# Patient Record
Sex: MALE | Race: WHITE | URBAN - METROPOLITAN AREA
[De-identification: names, ages, dates, MRNs, and addresses within clinical notes are randomized per-mention and may not be internally consistent; named-entity substitution may affect disease eponyms.]

---

## 2023-12-24 ENCOUNTER — HOSPITAL ENCOUNTER (EMERGENCY)
Age: 38
Discharge: HOME | End: 2023-12-24
Attending: EMERGENCY MEDICINE
Payer: COMMERCIAL

## 2023-12-24 ENCOUNTER — APPOINTMENT (OUTPATIENT)
Dept: GENERAL RADIOLOGY | Age: 38
End: 2023-12-24
Payer: COMMERCIAL

## 2023-12-24 VITALS
RESPIRATION RATE: 18 BRPM | BODY MASS INDEX: 33.44 KG/M2 | OXYGEN SATURATION: 97 % | DIASTOLIC BLOOD PRESSURE: 73 MMHG | TEMPERATURE: 97.5 F | SYSTOLIC BLOOD PRESSURE: 137 MMHG | WEIGHT: 246.91 LBS | HEART RATE: 86 BPM | HEIGHT: 72 IN

## 2023-12-24 DIAGNOSIS — S91.332A PUNCTURE WOUND OF LEFT FOOT, INITIAL ENCOUNTER: Primary | ICD-10-CM

## 2023-12-24 PROCEDURE — 99284 EMERGENCY DEPT VISIT MOD MDM: CPT

## 2023-12-24 PROCEDURE — 90715 TDAP VACCINE 7 YRS/> IM: CPT

## 2023-12-24 PROCEDURE — 6360000002 HC RX W HCPCS

## 2023-12-24 PROCEDURE — 73630 X-RAY EXAM OF FOOT: CPT

## 2023-12-24 PROCEDURE — 96366 THER/PROPH/DIAG IV INF ADDON: CPT

## 2023-12-24 PROCEDURE — 2500000003 HC RX 250 WO HCPCS

## 2023-12-24 PROCEDURE — 96365 THER/PROPH/DIAG IV INF INIT: CPT

## 2023-12-24 PROCEDURE — 6360000002 HC RX W HCPCS: Performed by: EMERGENCY MEDICINE

## 2023-12-24 PROCEDURE — 90471 IMMUNIZATION ADMIN: CPT

## 2023-12-24 PROCEDURE — 96367 TX/PROPH/DG ADDL SEQ IV INF: CPT

## 2023-12-24 PROCEDURE — 96375 TX/PRO/DX INJ NEW DRUG ADDON: CPT

## 2023-12-24 PROCEDURE — 2580000003 HC RX 258

## 2023-12-24 RX ORDER — LIDOCAINE HYDROCHLORIDE 10 MG/ML
INJECTION, SOLUTION EPIDURAL; INFILTRATION; INTRACAUDAL; PERINEURAL
Status: COMPLETED
Start: 2023-12-24 | End: 2023-12-24

## 2023-12-24 RX ORDER — DOXYCYCLINE HYCLATE 100 MG
100 TABLET ORAL 2 TIMES DAILY
Qty: 20 TABLET | Refills: 0 | Status: SHIPPED | OUTPATIENT
Start: 2023-12-25 | End: 2024-01-04

## 2023-12-24 RX ORDER — MORPHINE SULFATE 2 MG/ML
2 INJECTION, SOLUTION INTRAMUSCULAR; INTRAVENOUS ONCE
Status: DISCONTINUED | OUTPATIENT
Start: 2023-12-24 | End: 2023-12-24

## 2023-12-24 RX ORDER — CIPROFLOXACIN 250 MG/1
500 TABLET, FILM COATED ORAL ONCE
Status: DISCONTINUED | OUTPATIENT
Start: 2023-12-24 | End: 2023-12-24 | Stop reason: SDUPTHER

## 2023-12-24 RX ORDER — CIPROFLOXACIN 500 MG/1
500 TABLET, FILM COATED ORAL 2 TIMES DAILY
Qty: 14 TABLET | Refills: 0 | Status: SHIPPED | OUTPATIENT
Start: 2023-12-25 | End: 2024-01-01

## 2023-12-24 RX ORDER — CIPROFLOXACIN 250 MG/1
500 TABLET, FILM COATED ORAL ONCE
Status: DISCONTINUED | OUTPATIENT
Start: 2023-12-24 | End: 2023-12-24

## 2023-12-24 RX ORDER — LIDOCAINE HYDROCHLORIDE 10 MG/ML
10 INJECTION, SOLUTION EPIDURAL; INFILTRATION; INTRACAUDAL; PERINEURAL ONCE
Status: COMPLETED | OUTPATIENT
Start: 2023-12-24 | End: 2023-12-24

## 2023-12-24 RX ORDER — ONDANSETRON 2 MG/ML
4 INJECTION INTRAMUSCULAR; INTRAVENOUS ONCE
Status: COMPLETED | OUTPATIENT
Start: 2023-12-24 | End: 2023-12-24

## 2023-12-24 RX ORDER — CIPROFLOXACIN 500 MG/1
500 TABLET, FILM COATED ORAL 2 TIMES DAILY
Qty: 14 TABLET | Refills: 0 | Status: SHIPPED | OUTPATIENT
Start: 2023-12-25 | End: 2023-12-24

## 2023-12-24 RX ORDER — CIPROFLOXACIN 500 MG/1
500 TABLET, FILM COATED ORAL 2 TIMES DAILY
Qty: 14 TABLET | Refills: 0 | Status: SHIPPED | OUTPATIENT
Start: 2023-12-24 | End: 2023-12-24

## 2023-12-24 RX ORDER — MORPHINE SULFATE 4 MG/ML
4 INJECTION, SOLUTION INTRAMUSCULAR; INTRAVENOUS ONCE
Status: COMPLETED | OUTPATIENT
Start: 2023-12-24 | End: 2023-12-24

## 2023-12-24 RX ORDER — DOXYCYCLINE HYCLATE 100 MG
100 TABLET ORAL 2 TIMES DAILY
Qty: 20 TABLET | Refills: 0 | Status: SHIPPED | OUTPATIENT
Start: 2023-12-25 | End: 2023-12-24

## 2023-12-24 RX ADMIN — LIDOCAINE HYDROCHLORIDE 10 ML: 10 INJECTION, SOLUTION EPIDURAL; INFILTRATION; INTRACAUDAL; PERINEURAL at 19:45

## 2023-12-24 RX ADMIN — TETANUS TOXOID, REDUCED DIPHTHERIA TOXOID AND ACELLULAR PERTUSSIS VACCINE, ADSORBED 0.5 ML: 5; 2.5; 8; 8; 2.5 SUSPENSION INTRAMUSCULAR at 18:05

## 2023-12-24 RX ADMIN — VANCOMYCIN HYDROCHLORIDE 1000 MG: 1 INJECTION, POWDER, LYOPHILIZED, FOR SOLUTION INTRAVENOUS at 18:54

## 2023-12-24 RX ADMIN — ONDANSETRON 4 MG: 2 INJECTION INTRAMUSCULAR; INTRAVENOUS at 18:45

## 2023-12-24 RX ADMIN — MORPHINE SULFATE 4 MG: 4 INJECTION INTRAVENOUS at 18:46

## 2023-12-24 RX ADMIN — CEFEPIME 2000 MG: 2 INJECTION, POWDER, FOR SOLUTION INTRAVENOUS at 18:11

## 2023-12-24 NOTE — DISCHARGE INSTRUCTIONS
You are being evaluated for a nail puncture wound through your left foot. Your wound went through your shoe and puts you at an increased risk of Pseudomonas bacteria. You have been put on antibiotics to cover this. These include ciprofloxacin 500 mg twice a day for 7 days and doxycycline 100 mg twice a day for 10 days. Podiatry was also consulted and they recommended additional antibiotics. These included 1 dose of IV cefepime 2 g and 1 dose of IV vancomycin 1 g in the hospital.  They further opened up with a better irrigated and placed with sutures. Your left extremity was negative for any acute fractures or dislocations. Since your last tetanus shot was over 5 years ago around 2015. You are given a tetanus shot today. Podiatry closed part of the wound with sutures and left the middle open to drain. The sutures can come out at either the 1 week follow-up appointment or can stay in 2 weeks. This will be determined at the 1 week follow-up appointment with podiatry. Podiatry also ordered nonweightbearing till follow-up appointment on left leg. And ordered a controlled walking boot and crutches. Since your pharmacy may be closed on Christmas, we will give you printed prescriptions for your antibiotics so once you find a pharmacy that is open and is convenient for you can take it there and fill it. Please follow-up with your primary care doctor. Please also follow-up with a podiatrist (Dr. Josh Mcconnell in his [place] Office) in 7 days. If you have any questions or your symptoms worsen please contact your podiatrist or your primary care provider. The patient understands that at this time there is no evidence for a more malignant underlying process, but the patient also understands that early in the process of an illness or injury, an emergency department workup can be falsely reassuring.  Routine discharge counseling was given, and the patient understands that worsening, changing or persistent

## 2023-12-24 NOTE — CONSULTS
Consult Note  Foot and Ankle Surgery    Subjective     Chief Complaint: Puncture Wound Left Foot    HPI:  Rosetta Primrose is a 45 y.o. male seen at [facility] ED for puncture wound to the left foot. Patient states that about 3 hours ago he was working in his yard and stepped on a akli nail that was about 4-6 inches long. Patient states that the nail went through his shoe and into his foot and he experienced immediate severe pain and difficulty ambulating. Patient states that he initially attempted to wash the wound at home, but after experiencing increased pain over the following hours, decided to present to the ED. Upon evaluation in the ED, a puncture wound was noted to the lateral midfoot. X-rays were obtained showing no retained foreign body and no bony involvement. Tetanus vaccination was updated. Attempt was made to irrigate the wound in the ED. Podiatry was consulted for management of the puncture wound and to establish follow up. PCP is No primary care provider on file. ROS:   Review of Systems   Constitutional:  Positive for activity change. Negative for chills, diaphoresis, fatigue and fever. HENT: Negative. Eyes: Negative. Respiratory: Negative. Cardiovascular: Negative. Gastrointestinal: Negative. Endocrine: Negative. Genitourinary: Negative. Musculoskeletal:  Positive for arthralgias, gait problem and myalgias. Negative for joint swelling. Skin:  Positive for wound (Punture wound to lateral midfoot). Past Medical History   has no past medical history on file. Past Surgical History   has no past surgical history on file. Medications  Prior to Admission medications    Medication Sig Start Date End Date Taking?  Authorizing Provider   doxycycline hyclate (VIBRA-TABS) 100 MG tablet Take 1 tablet by mouth 2 times daily for 10 days 12/25/23 1/4/24 Yes Emily Deal MD   ciprofloxacin (CIPRO) 500 MG tablet Take 1 tablet by mouth 2 times daily for 7 days